# Patient Record
Sex: FEMALE | Race: WHITE | NOT HISPANIC OR LATINO | URBAN - NONMETROPOLITAN AREA
[De-identification: names, ages, dates, MRNs, and addresses within clinical notes are randomized per-mention and may not be internally consistent; named-entity substitution may affect disease eponyms.]

---

## 2021-04-01 NOTE — PATIENT DISCUSSION
"""discussed with patient that the area is healing. possible irritated from patient rubbing the area.  ""

## 2022-02-14 ENCOUNTER — NEW PATIENT (OUTPATIENT)
Dept: URBAN - NONMETROPOLITAN AREA CLINIC 6 | Facility: CLINIC | Age: 37
End: 2022-02-14

## 2022-02-14 DIAGNOSIS — H52.13: ICD-10-CM

## 2022-02-14 DIAGNOSIS — H04.123: ICD-10-CM

## 2022-02-14 PROCEDURE — 92004 COMPRE OPH EXAM NEW PT 1/>: CPT

## 2022-02-14 PROCEDURE — 92015 DETERMINE REFRACTIVE STATE: CPT

## 2022-02-14 ASSESSMENT — KERATOMETRY
OD_AXISANGLE_DEGREES: 6
OD_AXISANGLE2_DEGREES: 96
OD_K2POWER_DIOPTERS: 46.00
OS_AXISANGLE_DEGREES: 165
OS_K1POWER_DIOPTERS: 44.75
OS_AXISANGLE2_DEGREES: 75
OS_K2POWER_DIOPTERS: 46.25
OD_K1POWER_DIOPTERS: 44.50

## 2022-02-14 ASSESSMENT — TONOMETRY
OS_IOP_MMHG: 16
OD_IOP_MMHG: 16

## 2022-02-14 ASSESSMENT — VISUAL ACUITY
OU_CC: 20/20
OD_CC: 20/20
OS_CC: 20/20-1

## 2022-02-28 ENCOUNTER — CONTACT LENSES/GLASSES VISIT (OUTPATIENT)
Dept: URBAN - NONMETROPOLITAN AREA CLINIC 6 | Facility: CLINIC | Age: 37
End: 2022-02-28

## 2022-02-28 DIAGNOSIS — H52.13: ICD-10-CM

## 2022-02-28 PROCEDURE — 92310D CONTACT LENS 80

## 2022-04-21 NOTE — PATIENT DISCUSSION
Drop regiment discussed with patient. Start PF tears OU 2-3x/day or as needed. Start warm compresses OU BID. Start lid scrubs OU BID. RTC in 3-4 months for dry eye f/u, drop instructions given to patient today.

## 2022-04-21 NOTE — PATIENT DISCUSSION
H/O stroke per patient. Will order HVF 30-2 fast at patient convenience for baseline. Denies any peripheral vision loss.

## 2023-04-10 ENCOUNTER — COMPREHENSIVE EXAM (OUTPATIENT)
Dept: URBAN - NONMETROPOLITAN AREA CLINIC 6 | Facility: CLINIC | Age: 38
End: 2023-04-10

## 2023-04-10 DIAGNOSIS — H52.13: ICD-10-CM

## 2023-04-10 DIAGNOSIS — H04.123: ICD-10-CM

## 2023-04-10 PROCEDURE — 92015 DETERMINE REFRACTIVE STATE: CPT

## 2023-04-10 PROCEDURE — 92310D CONTACT LENS 80

## 2023-04-10 PROCEDURE — 92014 COMPRE OPH EXAM EST PT 1/>: CPT

## 2023-04-10 ASSESSMENT — KERATOMETRY
OS_AXISANGLE_DEGREES: 165
OS_K2POWER_DIOPTERS: 46.50
OD_K1POWER_DIOPTERS: 44.75
OS_K1POWER_DIOPTERS: 45.00
OD_AXISANGLE2_DEGREES: 91
OS_AXISANGLE2_DEGREES: 75
OD_AXISANGLE_DEGREES: 001
OD_K2POWER_DIOPTERS: 46.00

## 2023-04-10 ASSESSMENT — TONOMETRY
OS_IOP_MMHG: 13
OD_IOP_MMHG: 13

## 2023-04-10 ASSESSMENT — VISUAL ACUITY
OS_CC: 20/20
OD_CC: 20/20